# Patient Record
(demographics unavailable — no encounter records)

---

## 2025-03-13 NOTE — HISTORY OF PRESENT ILLNESS
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

## 2025-03-13 NOTE — PLAN
[FreeTextEntry1] : 1. elevated LDL * lab for fasting lipid panel, cmp * low cholesterol, low triglycerides diet, dietary counseling given; dietary avoidance discussed; diet and exercise reviewed with patient 2. prediabetes * lab for a1c * Dietary counseling given, dietary avoidance discussed, diet and exercise reviewed with patient; patient reminded of importance of aerobic exercise, weight control, dietary compliance and regular glucose monitoring 3. vitamin D insufficiency * lab for serum level * c/w supplement 4. muscle cramps * recommended magnesium oxide daily 5. constipation * recommended increase fiber and water intake * f/u in three months

## 2025-06-26 NOTE — PLAN
[FreeTextEntry1] : 1. hypercholesterolemia * dietary adherence compliance stressed * low cholesterol, low triglycerides diet, dietary counseling given; dietary avoidance discussed; diet and exercise reviewed with patient 2. prediabetes * Dietary counseling given, dietary avoidance discussed, diet and exercise reviewed with patient; patient reminded of importance of aerobic exercise, weight control, dietary compliance and regular glucose monitoring 3. vitamin D insufficiency * c/w supplement * f/u in three months , will repeat fasting blood work
